# Patient Record
Sex: MALE | Race: ASIAN | Employment: UNEMPLOYED | URBAN - METROPOLITAN AREA
[De-identification: names, ages, dates, MRNs, and addresses within clinical notes are randomized per-mention and may not be internally consistent; named-entity substitution may affect disease eponyms.]

---

## 2020-09-26 ENCOUNTER — APPOINTMENT (OUTPATIENT)
Dept: CT IMAGING | Facility: HOSPITAL | Age: 50
End: 2020-09-26
Attending: EMERGENCY MEDICINE
Payer: COMMERCIAL

## 2020-09-26 ENCOUNTER — APPOINTMENT (OUTPATIENT)
Dept: GENERAL RADIOLOGY | Facility: HOSPITAL | Age: 50
End: 2020-09-26
Attending: EMERGENCY MEDICINE
Payer: COMMERCIAL

## 2020-09-26 PROBLEM — E87.6 HYPOKALEMIA: Status: ACTIVE | Noted: 2020-09-26

## 2020-09-26 PROBLEM — R73.9 HYPERGLYCEMIA: Status: ACTIVE | Noted: 2020-09-26

## 2020-09-26 PROBLEM — R41.89 UNRESPONSIVE EPISODE: Status: ACTIVE | Noted: 2020-09-26

## 2020-09-26 PROCEDURE — 71045 X-RAY EXAM CHEST 1 VIEW: CPT | Performed by: EMERGENCY MEDICINE

## 2020-09-26 PROCEDURE — 70496 CT ANGIOGRAPHY HEAD: CPT | Performed by: EMERGENCY MEDICINE

## 2020-09-26 PROCEDURE — 70450 CT HEAD/BRAIN W/O DYE: CPT | Performed by: EMERGENCY MEDICINE

## 2020-09-26 PROCEDURE — 70498 CT ANGIOGRAPHY NECK: CPT | Performed by: EMERGENCY MEDICINE

## 2020-09-26 NOTE — H&P
[facility] HOSPITAL[facility]  History and Physical     [place] Patient Status:  Emergency    1970 MRN [id number]   Location [facility] Attending Bryce Borges MD   Hosp Day # 0 PCP Hansa Arroyo MD     Chief Complai positives and negatives noted in the HPI. Physical Exam:    /88   Pulse 71   Temp 98 °F (36.7 °C) (Temporal)   Resp 17   Ht 5' 10\" (1.778 m)   Wt 187 lb 2.7 oz (84.9 kg)   SpO2 96%   BMI 26.86 kg/m²   General: No acute distress.  Alert and oriente room    Quality:  · DVT Prophylaxis: scd  · CODE status: full  · Stallings: no  · If COVID testing is negative, may discontinue isolation: yes     Plan of care discussed with patient and ED physician    Gonsalo Cr MD  9/26/2020

## 2020-09-26 NOTE — ED PROVIDER NOTES
Patient Seen in: [facility] Emergency Department      History   Patient presents with:  Stroke    Stated Complaint: Stroke/Aphasia    HPI    Patient presents as a code stroke. The patient fell at home when he went to go up the stairs.   His wife sta kg/m²         Physical Exam    General: Awake, speaking but very slowly and softly, oriented and following commands. HEENT: Normocephalic, atraumatic, pupils equal round and reactive to light, oropharynx clear. Neck: Supple.   Cardiovascular: Regular rate RAINBOW DRAW GOLD   CBC W/ DIFFERENTIAL     EKG    Rate, intervals and axes as noted on EKG Report.   Rate: 84  Rhythm: Sinus Rhythm  Reading: No acute ischemic abnormality              Xr Chest Ap Portable  (cpt=71045)    Result Date: 9/26/2020 Iv)(cpt=70496/84640)    Result Date: 9/26/2020  PROCEDURE:  CT STROKE CTA BRAIN/CTA NECK (W IV)(CPT=70496/78688)  COMPARISON:  None.   INDICATIONS:  eval for stroke  TECHNIQUE  Noncontrast CT scanning is performed through the brain and CT angiography of the Critical value test result called to Dr. Brenda Stallworth in the ER with accurate read back. 1307 hours.    Dictated by (CST): Nidia Meckel, MD on 9/26/2020 at 1:05 PM     Finalized by (CST): Nidia Meckel, MD on 9/26/2020 at 1:09 PM       The patient's initial NIH st

## 2020-09-26 NOTE — SIGNIFICANT EVENT
Called to C4/C4 at 12:16 for Code Stroke en route. Reported symptoms include aphasic, 55yo male with NIH:10. Last known normal 0-4 hours. Arrived to dept at 12:20. Patient arrived to ED @ 1231. Upon arrival found patient with NIH of 0.   Speech clear

## 2020-09-27 ENCOUNTER — APPOINTMENT (OUTPATIENT)
Dept: MRI IMAGING | Facility: HOSPITAL | Age: 50
End: 2020-09-27
Attending: Other
Payer: COMMERCIAL

## 2020-09-27 PROCEDURE — 70551 MRI BRAIN STEM W/O DYE: CPT | Performed by: OTHER

## 2020-09-27 NOTE — CONSULTS
[facility]  Report of Consultation    Julia Rahman Patient Status:  Observation    1970 MRN [id number]   [facility] 7NE-A Attending Ulysses Joyce, [address] [facility] Day # [facility]      Reason for Consultation:   S medications on file prior to encounter. Allergies:   No Known Allergies    Review of Systems:   No fevers, chills, change in weight or bowel habits. Ten point review of systems is otherwise negative or unremarkable.     Physical Exam:    09/27/20  082 MCV 86.9   MCH 28.2   MCHC 32.4   RDW 11.9   NEPRELIM 6.13   WBC 8.7   .0       Recent Labs   Lab 09/26/20  1305   PTP 13.5   INR 1.00       Recent Labs   Lab 09/26/20  1305   TROP <0.045       Diagnostics:   Tele:  SR  EKG: Normal      Assessment

## 2020-09-27 NOTE — DISCHARGE SUMMARY
[facility]ITAL[facility]  DISCHARGE SUMMARY     Jonna Jacob Patient Status:  Observation    1970 MRN [id number]   [facility] 7NE-A Attending Adia Grover, [address] [facility] Day # 0 [facility]      Date of Admission: 2020  Date of Dischar descriptions):  • None    Lab/Test results pending at Discharge:   · None    Consultants:  • Neurology  • Cardiology     Discharge Medication List:     Discharge Medications      You have not been prescribed any medications.          [place]

## 2020-09-27 NOTE — PLAN OF CARE
Pt stable overnight. No neuro deficits noted. A/O x 4. Denies any dizziness or lightheadedness. VSS. NSR on tele. O2 sats > 92% on RA.  in place. Seizure precautions maintained. EEG and MRI ordered. Pt sleeping comfortably at this time. Will monitor.

## 2020-09-27 NOTE — CONSULTS
Neurology consult NOTE    The reason for consultation:  Seizure like activities. HPI:   Rach Lema is a 54year old male who is admitted due to seizure like activities. His wife is at bed side.  Pt reported he finished his breakfast and walking to [facility] file    Lifestyle      Physical activity        Days per week: Not on file        Minutes per session: Not on file      Stress: Not on file    Relationships      Social connections        Talks on phone: Not on file        Gets together: Not on file Pupils: pupil was 2-3 mm, respond to light bilaterally.       EOM/lids NL     Facial sensation/Corneal: NL     Face (motor) NL     Hearing: NL     Palate: NL     SCM/Trapezius: 5/5     Tongue in the middle    MOTOR FUNCTION:     Tone: NL     Bulk: NL effusion or pneumothorax. IMPRESSION: Unremarkable portable chest radiograph.     Dictated by (CST): Christina Saucedo MD on 9/26/2020 at 1:53 PM     Finalized by (CST): Christina Saucedo MD on 9/26/2020 at 1:54 PM       Ct Stroke Brain (no Iv)(cpt=70450) MD

## 2020-09-27 NOTE — PLAN OF CARE
Assumed pt care at 0730  Neuros intact, no deficits  NSR on tele  Pt denies pain  MRI negative  EEG and ECHO to be done  Needs attended to.     Ok to d/c stroke protocol per Dr. Richmond Prom      Problem: Patient/Family Goals  Goal: Patient/Family Long Term Goal

## 2020-09-27 NOTE — PROGRESS NOTES
[facility]PITAL[facility]  Progress Note     Jean Canales Patient Status:  Observation    1970 MRN [id number]   [facility] 7NE-A Attending Festus Fountain, [address] [facility] Day # 0 [place] [id number] [facility]     Chief Complaint: Syncope v seizure    S: Patient Epic.    Medications:     ASSESSMENT / PLAN:     1. Syncope v seizure  1. MRI brain pending  2. EEG OP per neurology  3. Cardiology evaluating for syncope  1. Echo pending  2. Disposition  1.  D/c planning if echo/MRI negative and ok with neuro/cardio    George Mendoza

## 2020-09-28 ENCOUNTER — APPOINTMENT (OUTPATIENT)
Dept: CV DIAGNOSTICS | Facility: HOSPITAL | Age: 50
End: 2020-09-28
Attending: INTERNAL MEDICINE
Payer: COMMERCIAL

## 2020-09-28 ENCOUNTER — APPOINTMENT (OUTPATIENT)
Dept: CV DIAGNOSTICS | Facility: HOSPITAL | Age: 50
End: 2020-09-28
Attending: HOSPITALIST
Payer: COMMERCIAL

## 2020-09-28 ENCOUNTER — NURSE ONLY (OUTPATIENT)
Dept: ELECTROPHYSIOLOGY | Facility: HOSPITAL | Age: 50
End: 2020-09-28
Attending: Other
Payer: COMMERCIAL

## 2020-09-28 PROCEDURE — 93306 TTE W/DOPPLER COMPLETE: CPT | Performed by: HOSPITALIST

## 2020-09-28 PROCEDURE — 93018 CV STRESS TEST I&R ONLY: CPT | Performed by: INTERNAL MEDICINE

## 2020-09-28 PROCEDURE — 93017 CV STRESS TEST TRACING ONLY: CPT | Performed by: INTERNAL MEDICINE

## 2020-09-28 PROCEDURE — 93350 STRESS TTE ONLY: CPT | Performed by: INTERNAL MEDICINE

## 2020-09-28 NOTE — PROCEDURES
Clinical History: 54year old male with syncope.      EEG DESCRIPTION    AWAKE  Background: 9-10Hz; 30-70 uV; posterior head regions, symmetric, waxing and waning, reactive to eye opening and closure  Beta: 15-25 Hz; 20 uV; frontocentral, symmetric, waxing

## 2020-09-28 NOTE — PROGRESS NOTES
[facility] Cardiology  Progress Note    Veronica Coronado Patient Status:  Observation    1970 MRN [id number]   [facility] 7NE-A Attending Johnathon Sanders, [address] [facility] Day # 0 PCP CARL[facility]     Subjective:   No chest pain.   No shor normoactive. No organomegaly is appreciated. Extremities:  Extremities do not demonstrate any evidence of peripheral edema. No cyanosis or clubbing of the digits is appreciated. Neurologic:  Alert and oriented. Normal affect.   Integument:  No visible

## 2020-09-28 NOTE — PLAN OF CARE
Echo and stress echo reviewed. NO evidence of ischemia, or valvular abnormalities. Stable CV status for DC. TTM scheduled to be  tomorrow and worn x 30 days. Repeat BMP 1 week.      WHITNEY Nathan

## 2020-09-28 NOTE — PLAN OF CARE
NURSING DISCHARGE NOTE    Assumed pt care at 0730  VSS  No complaints of pain   Neuros intact  EEG, ECHO and stress tests negative  Ok to d/c per all consults    Ivs removed  Tele removed    Plan for outpatient event monitor  Follow-ups and education p

## 2020-09-28 NOTE — PROGRESS NOTES
Subjective     No overnight events    • Potassium Chloride ER  40 mEq Oral Once     Objective     /74 (BP Location: Left arm)   Pulse 67   Temp 98 °F (36.7 °C) (Oral)   Resp 15   Ht 5' 10\" (1.778 m)   Wt 187 lb 2.7 oz (84.9 kg)   SpO2 98%   BMI 26. 8

## 2020-09-28 NOTE — PLAN OF CARE
Assumed pt care at [address] [place] [address] for the night shift. Pt resting in bed in no apparent distress. Denies any cp, lightheadedness or dizziness. Neurologically intact. VSS. Afebrile. NSR on tele. O2 sats >RA.  in place. Seizure precautions in place.   EEG and ECHO o

## 2022-03-11 ENCOUNTER — WALK IN (OUTPATIENT)
Dept: URGENT CARE | Age: 52
End: 2022-03-11

## 2022-03-11 VITALS
SYSTOLIC BLOOD PRESSURE: 128 MMHG | OXYGEN SATURATION: 100 % | DIASTOLIC BLOOD PRESSURE: 76 MMHG | RESPIRATION RATE: 16 BRPM | TEMPERATURE: 97.6 F | HEART RATE: 86 BPM

## 2022-03-11 DIAGNOSIS — Z76.89 ENCOUNTER TO ESTABLISH CARE: ICD-10-CM

## 2022-03-11 DIAGNOSIS — S49.92XA INJURY OF LEFT SHOULDER, INITIAL ENCOUNTER: Primary | ICD-10-CM

## 2022-03-11 PROCEDURE — 99203 OFFICE O/P NEW LOW 30 MIN: CPT | Performed by: INTERNAL MEDICINE

## 2022-03-11 RX ORDER — TIZANIDINE 4 MG/1
4 TABLET ORAL EVERY 8 HOURS PRN
Qty: 15 TABLET | Refills: 0 | Status: SHIPPED | OUTPATIENT
Start: 2022-03-11 | End: 2022-09-27

## 2022-03-11 RX ORDER — PREDNISONE 50 MG/1
50 TABLET ORAL DAILY
Qty: 5 TABLET | Refills: 0 | Status: SHIPPED | OUTPATIENT
Start: 2022-03-11 | End: 2022-03-16

## 2022-03-11 ASSESSMENT — PAIN SCALES - GENERAL: PAINLEVEL: 5

## 2022-05-26 NOTE — PLAN OF CARE
Problem: Physical Therapy  Goal: Physical Therapy Goal  Description: Goals to be met by: 22     Patient will increase functional independence with mobility by performin. Supine to sit with Stand-by Assistance  2. Sit to stand transfer with Stand-by Assistance  3. Gait  x 200 feet with Stand-by Assistance using No Assistive Device or RW.  4. Ascend/descend 3 stair with right Handrails Contact Guard Assistance using No Assistive Device.     Outcome: Ongoing, Progressing      NURSING ADMISSION NOTE      Patient admitted via Cart  Oriented to room. Safety precautions initiated. Bed in low position. Call light in reach. Admission navigator completed with patient. PCP updated on patients chart.  A/ox4, VSS, no neuro defic

## 2022-09-27 ENCOUNTER — IMAGING SERVICES (OUTPATIENT)
Dept: GENERAL RADIOLOGY | Age: 52
End: 2022-09-27
Attending: INTERNAL MEDICINE

## 2022-09-27 ENCOUNTER — LAB SERVICES (OUTPATIENT)
Dept: LAB | Age: 52
End: 2022-09-27

## 2022-09-27 ENCOUNTER — OFFICE VISIT (OUTPATIENT)
Dept: INTERNAL MEDICINE | Age: 52
End: 2022-09-27

## 2022-09-27 ENCOUNTER — TELEPHONE (OUTPATIENT)
Dept: GASTROENTEROLOGY | Age: 52
End: 2022-09-27

## 2022-09-27 VITALS
OXYGEN SATURATION: 98 % | SYSTOLIC BLOOD PRESSURE: 108 MMHG | HEIGHT: 68 IN | WEIGHT: 184 LBS | DIASTOLIC BLOOD PRESSURE: 68 MMHG | HEART RATE: 86 BPM | TEMPERATURE: 97.1 F | RESPIRATION RATE: 18 BRPM | BODY MASS INDEX: 27.89 KG/M2

## 2022-09-27 DIAGNOSIS — Z00.00 ANNUAL PHYSICAL EXAM: ICD-10-CM

## 2022-09-27 DIAGNOSIS — Z12.11 SPECIAL SCREENING FOR MALIGNANT NEOPLASMS, COLON: Primary | ICD-10-CM

## 2022-09-27 DIAGNOSIS — Z12.11 SCREEN FOR COLON CANCER: ICD-10-CM

## 2022-09-27 DIAGNOSIS — M54.2 MUSCULOSKELETAL NECK PAIN: ICD-10-CM

## 2022-09-27 DIAGNOSIS — Z12.5 SCREENING PSA (PROSTATE SPECIFIC ANTIGEN): ICD-10-CM

## 2022-09-27 DIAGNOSIS — Z00.00 ANNUAL PHYSICAL EXAM: Primary | ICD-10-CM

## 2022-09-27 LAB
ALBUMIN SERPL-MCNC: 4.4 G/DL (ref 3.6–5.1)
ALP SERPL-CCNC: 99 U/L (ref 45–115)
ALT SERPL W/O P-5'-P-CCNC: 31 U/L (ref 5–49)
AST SERPL-CCNC: 28 U/L (ref 14–43)
BASOPHIL %: 0.4 % (ref 0–1.2)
BASOPHIL ABSOLUTE #: 0 10*3/UL (ref 0–0.1)
BILIRUB SERPL-MCNC: 0.8 MG/DL (ref 0–1.3)
BUN SERPL-MCNC: 12 MG/DL (ref 6–27)
CALCIUM SERPL-MCNC: 9.1 MG/DL (ref 8.6–10.6)
CHLORIDE SERPL-SCNC: 102 MMOL/L (ref 96–107)
CHOLEST SERPL-MCNC: 241 MG/DL (ref 140–200)
CO2 SERPL-SCNC: 30 MMOL/L (ref 22–32)
CREAT SERPL-MCNC: 0.8 MG/DL (ref 0.6–1.6)
DIFFERENTIAL TYPE: NORMAL
EOSINOPHIL %: 0.9 % (ref 0–10)
EOSINOPHIL ABSOLUTE #: 0.1 10*3/UL (ref 0–0.5)
EST. AVERAGE GLUCOSE BLD GHB EST-MCNC: 119 MG/DL (ref 0–154)
GFR SERPL CREATININE-BSD FRML MDRD: >60 ML/MIN/{1.73M2}
GFR SERPL CREATININE-BSD FRML MDRD: >60 ML/MIN/{1.73M2}
GLUCOSE P FAST SERPL-MCNC: 101 MG/DL (ref 60–100)
HBA1C MFR BLD: 5.8 % (ref 4.2–6)
HDLC SERPL-MCNC: 59 MG/DL
HEMATOCRIT: 44.2 % (ref 40–51)
HEMOGLOBIN: 15 G/DL (ref 13.7–17.5)
IMMATURE GRANULOCYTE ABSOLUTE: 0.02 10*3/UL (ref 0–0.05)
IMMATURE GRANULOCYTE PERCENT: 0.3 % (ref 0–0.5)
LDLC SERPL CALC-MCNC: 154 MG/DL (ref 30–100)
LYMPH PERCENT: 33.1 % (ref 20.5–51.1)
LYMPHOCYTE ABSOLUTE #: 2.6 10*3/UL (ref 1.2–3.4)
MEAN CORPUSCULAR HGB CONCENTRATION: 33.9 % (ref 32–36)
MEAN CORPUSCULAR HGB: 29.5 PG (ref 27–34)
MEAN CORPUSCULAR VOLUME: 87 FL (ref 79–95)
MEAN PLATELET VOLUME: 10.3 FL (ref 8.6–12.4)
MONOCYTE ABSOLUTE #: 0.4 10*3/UL (ref 0.2–0.9)
MONOCYTE PERCENT: 5.4 % (ref 4.3–12.9)
NEUTROPHIL ABSOLUTE #: 4.7 10*3/UL (ref 1.4–6.5)
NEUTROPHIL PERCENT: 59.9 % (ref 34–73.5)
PLATELET COUNT: 357 10*3/UL (ref 150–400)
POTASSIUM SERPL-SCNC: 4.7 MMOL/L (ref 3.5–5.3)
PROT SERPL-MCNC: 7.7 G/DL (ref 6.4–8.5)
RED BLOOD CELL COUNT: 5.08 10*6/UL (ref 3.9–5.7)
RED CELL DISTRIBUTION WIDTH: 11.9 % (ref 11.3–14.8)
SODIUM SERPL-SCNC: 138 MMOL/L (ref 136–146)
TRIGL SERPL-MCNC: 141 MG/DL (ref 0–200)
TSH SERPL DL<=0.05 MIU/L-ACNC: 2.04 M[IU]/L (ref 0.3–4.82)
WHITE BLOOD CELL COUNT: 7.8 10*3/UL (ref 4–10)

## 2022-09-27 PROCEDURE — 80061 LIPID PANEL: CPT | Performed by: INTERNAL MEDICINE

## 2022-09-27 PROCEDURE — 36415 COLL VENOUS BLD VENIPUNCTURE: CPT | Performed by: INTERNAL MEDICINE

## 2022-09-27 PROCEDURE — 72050 X-RAY EXAM NECK SPINE 4/5VWS: CPT | Performed by: RADIOLOGY

## 2022-09-27 PROCEDURE — 83036 HEMOGLOBIN GLYCOSYLATED A1C: CPT | Performed by: INTERNAL MEDICINE

## 2022-09-27 PROCEDURE — 80050 GENERAL HEALTH PANEL: CPT | Performed by: INTERNAL MEDICINE

## 2022-09-27 PROCEDURE — 99396 PREV VISIT EST AGE 40-64: CPT | Performed by: INTERNAL MEDICINE

## 2022-09-27 PROCEDURE — G0103 PSA SCREENING: HCPCS | Performed by: INTERNAL MEDICINE

## 2022-09-27 RX ORDER — SIMETHICONE 125 MG
TABLET,CHEWABLE ORAL
Qty: 2 TABLET | Refills: 0 | Status: SHIPPED | OUTPATIENT
Start: 2022-09-27 | End: 2022-11-11

## 2022-09-27 RX ORDER — BISACODYL 5 MG/1
TABLET, DELAYED RELEASE ORAL
Qty: 2 TABLET | Refills: 0 | Status: SHIPPED | OUTPATIENT
Start: 2022-09-27 | End: 2022-11-11

## 2022-09-27 ASSESSMENT — PATIENT HEALTH QUESTIONNAIRE - PHQ9
2. FEELING DOWN, DEPRESSED OR HOPELESS: NOT AT ALL
SUM OF ALL RESPONSES TO PHQ9 QUESTIONS 1 AND 2: 0
SUM OF ALL RESPONSES TO PHQ9 QUESTIONS 1 AND 2: 0
1. LITTLE INTEREST OR PLEASURE IN DOING THINGS: NOT AT ALL
CLINICAL INTERPRETATION OF PHQ2 SCORE: NO FURTHER SCREENING NEEDED

## 2022-09-27 ASSESSMENT — PAIN SCALES - GENERAL: PAINLEVEL: 0

## 2022-09-28 DIAGNOSIS — D12.3 BENIGN NEOPLASM OF TRANSVERSE COLON: ICD-10-CM

## 2022-09-28 DIAGNOSIS — M54.2 MUSCULOSKELETAL NECK PAIN: ICD-10-CM

## 2022-09-28 DIAGNOSIS — Z12.11 ENCOUNTER FOR SCREENING FOR MALIGNANT NEOPLASM OF COLON: ICD-10-CM

## 2022-09-28 DIAGNOSIS — D12.2 BENIGN NEOPLASM OF ASCENDING COLON: ICD-10-CM

## 2022-09-28 DIAGNOSIS — R93.89 ABNORMAL X-RAY: Primary | ICD-10-CM

## 2022-09-29 LAB — PSA SERPL-MCNC: 0.86 NG/ML (ref 0–2.8)

## 2022-10-10 ENCOUNTER — ANESTHESIA (OUTPATIENT)
Dept: GASTROENTEROLOGY | Age: 52
End: 2022-10-10

## 2022-10-10 ENCOUNTER — ANESTHESIA EVENT (OUTPATIENT)
Dept: GASTROENTEROLOGY | Age: 52
End: 2022-10-10

## 2022-10-10 ENCOUNTER — HOSPITAL ENCOUNTER (OUTPATIENT)
Dept: GASTROENTEROLOGY | Age: 52
Discharge: HOME | End: 2022-10-10
Attending: INTERNAL MEDICINE

## 2022-10-10 VITALS
TEMPERATURE: 97.5 F | RESPIRATION RATE: 20 BRPM | DIASTOLIC BLOOD PRESSURE: 87 MMHG | HEART RATE: 64 BPM | OXYGEN SATURATION: 99 % | SYSTOLIC BLOOD PRESSURE: 142 MMHG

## 2022-10-10 DIAGNOSIS — Z12.11 SPECIAL SCREENING FOR MALIGNANT NEOPLASMS, COLON: ICD-10-CM

## 2022-10-10 DIAGNOSIS — K63.5 POLYP OF COLON, UNSPECIFIED PART OF COLON, UNSPECIFIED TYPE: ICD-10-CM

## 2022-10-10 PROCEDURE — 88305 TISSUE EXAM BY PATHOLOGIST: CPT | Performed by: PATHOLOGY

## 2022-10-10 PROCEDURE — 45385 COLONOSCOPY W/LESION REMOVAL: CPT | Performed by: CLINIC/CENTER

## 2022-10-10 PROCEDURE — 45380 COLONOSCOPY AND BIOPSY: CPT | Performed by: INTERNAL MEDICINE

## 2022-10-10 PROCEDURE — 45380 COLONOSCOPY AND BIOPSY: CPT | Performed by: CLINIC/CENTER

## 2022-10-10 PROCEDURE — 45385 COLONOSCOPY W/LESION REMOVAL: CPT | Performed by: INTERNAL MEDICINE

## 2022-10-10 RX ORDER — LIDOCAINE HYDROCHLORIDE 10 MG/ML
INJECTION, SOLUTION INFILTRATION; PERINEURAL PRN
Status: DISCONTINUED | OUTPATIENT
Start: 2022-10-10 | End: 2022-10-10

## 2022-10-10 RX ORDER — SODIUM CHLORIDE 9 MG/ML
INJECTION, SOLUTION INTRAVENOUS CONTINUOUS
Status: DISCONTINUED | OUTPATIENT
Start: 2022-10-10 | End: 2022-10-12 | Stop reason: HOSPADM

## 2022-10-10 RX ORDER — DEXTROSE MONOHYDRATE 50 MG/ML
INJECTION, SOLUTION INTRAVENOUS CONTINUOUS PRN
Status: DISCONTINUED | OUTPATIENT
Start: 2022-10-10 | End: 2022-10-12 | Stop reason: HOSPADM

## 2022-10-10 RX ORDER — PROPOFOL 10 MG/ML
INJECTION, EMULSION INTRAVENOUS PRN
Status: DISCONTINUED | OUTPATIENT
Start: 2022-10-10 | End: 2022-10-10

## 2022-10-10 RX ORDER — LIDOCAINE HYDROCHLORIDE 10 MG/ML
5-10 INJECTION, SOLUTION INFILTRATION; PERINEURAL PRN
Status: DISCONTINUED | OUTPATIENT
Start: 2022-10-10 | End: 2022-10-12 | Stop reason: HOSPADM

## 2022-10-10 RX ORDER — SODIUM CHLORIDE, SODIUM LACTATE, POTASSIUM CHLORIDE, CALCIUM CHLORIDE 600; 310; 30; 20 MG/100ML; MG/100ML; MG/100ML; MG/100ML
INJECTION, SOLUTION INTRAVENOUS CONTINUOUS
Status: DISCONTINUED | OUTPATIENT
Start: 2022-10-10 | End: 2022-10-12 | Stop reason: HOSPADM

## 2022-10-10 RX ADMIN — PROPOFOL 100 MG: 10 INJECTION, EMULSION INTRAVENOUS at 12:12

## 2022-10-10 RX ADMIN — PROPOFOL 30 MG: 10 INJECTION, EMULSION INTRAVENOUS at 12:14

## 2022-10-10 RX ADMIN — SODIUM CHLORIDE, SODIUM LACTATE, POTASSIUM CHLORIDE, CALCIUM CHLORIDE: 600; 310; 30; 20 INJECTION, SOLUTION INTRAVENOUS at 11:52

## 2022-10-10 RX ADMIN — SODIUM CHLORIDE, SODIUM LACTATE, POTASSIUM CHLORIDE, CALCIUM CHLORIDE: 600; 310; 30; 20 INJECTION, SOLUTION INTRAVENOUS at 12:00

## 2022-10-10 RX ADMIN — PROPOFOL 30 MG: 10 INJECTION, EMULSION INTRAVENOUS at 12:20

## 2022-10-10 RX ADMIN — LIDOCAINE HYDROCHLORIDE 50 MG: 10 INJECTION, SOLUTION INFILTRATION; PERINEURAL at 12:12

## 2022-10-10 RX ADMIN — PROPOFOL 30 MG: 10 INJECTION, EMULSION INTRAVENOUS at 12:22

## 2022-10-10 RX ADMIN — PROPOFOL 30 MG: 10 INJECTION, EMULSION INTRAVENOUS at 12:18

## 2022-10-10 RX ADMIN — PROPOFOL 30 MG: 10 INJECTION, EMULSION INTRAVENOUS at 12:16

## 2022-10-10 ASSESSMENT — ACTIVITIES OF DAILY LIVING (ADL)
ADL_BEFORE_ADMISSION: INDEPENDENT
ADL_SCORE: 12

## 2022-10-10 ASSESSMENT — PAIN SCALES - GENERAL: PAINLEVEL_OUTOF10: 0

## 2022-10-11 ENCOUNTER — LAB REQUISITION (OUTPATIENT)
Dept: LAB | Age: 52
End: 2022-10-11

## 2022-10-11 DIAGNOSIS — Z12.11 ENCOUNTER FOR SCREENING FOR MALIGNANT NEOPLASM OF COLON: ICD-10-CM

## 2022-10-11 PROCEDURE — 88305 TISSUE EXAM BY PATHOLOGIST: CPT | Performed by: CLINICAL MEDICAL LABORATORY

## 2022-10-12 LAB — AP REPORT: NORMAL

## 2022-10-18 LAB
CASE RPRT: NORMAL
PATH REPORT.FINAL DX SPEC: NORMAL

## 2022-10-24 ENCOUNTER — CLINICAL DOCUMENTATION (OUTPATIENT)
Dept: GASTROENTEROLOGY | Age: 52
End: 2022-10-24

## 2022-10-24 LAB — COLONOSCOPY STUDY: NORMAL
